# Patient Record
Sex: FEMALE | Race: WHITE | NOT HISPANIC OR LATINO | Employment: PART TIME | URBAN - METROPOLITAN AREA
[De-identification: names, ages, dates, MRNs, and addresses within clinical notes are randomized per-mention and may not be internally consistent; named-entity substitution may affect disease eponyms.]

---

## 2017-02-19 ENCOUNTER — HOSPITAL ENCOUNTER (OUTPATIENT)
Facility: HOSPITAL | Age: 47
Setting detail: OBSERVATION
Discharge: HOME | End: 2017-02-20
Attending: FAMILY MEDICINE | Admitting: INTERNAL MEDICINE

## 2017-02-19 ENCOUNTER — APPOINTMENT (OUTPATIENT)
Dept: GENERAL RADIOLOGY | Facility: HOSPITAL | Age: 47
End: 2017-02-19

## 2017-02-19 DIAGNOSIS — R07.9 CHEST PAIN, UNSPECIFIED TYPE: Primary | ICD-10-CM

## 2017-02-19 LAB
ALBUMIN SERPL-MCNC: 4.1 G/DL (ref 3.5–5.2)
ALBUMIN/GLOB SERPL: 1.6 G/DL
ALP SERPL-CCNC: 48 U/L (ref 39–117)
ALT SERPL W P-5'-P-CCNC: 36 U/L (ref 1–33)
ANION GAP SERPL CALCULATED.3IONS-SCNC: 14.5 MMOL/L
AST SERPL-CCNC: 31 U/L (ref 1–32)
BASOPHILS # BLD AUTO: 0.04 10*3/MM3 (ref 0–0.2)
BASOPHILS NFR BLD AUTO: 0.5 % (ref 0–1.5)
BILIRUB SERPL-MCNC: 0.4 MG/DL (ref 0.1–1.2)
BUN BLD-MCNC: 13 MG/DL (ref 6–20)
BUN/CREAT SERPL: 24.5 (ref 7–25)
CALCIUM SPEC-SCNC: 9 MG/DL (ref 8.6–10.5)
CHLORIDE SERPL-SCNC: 97 MMOL/L (ref 98–107)
CO2 SERPL-SCNC: 26.5 MMOL/L (ref 22–29)
CREAT BLD-MCNC: 0.53 MG/DL (ref 0.57–1)
DEPRECATED RDW RBC AUTO: 37.1 FL (ref 37–54)
EOSINOPHIL # BLD AUTO: 0.25 10*3/MM3 (ref 0–0.7)
EOSINOPHIL NFR BLD AUTO: 3.2 % (ref 0.3–6.2)
ERYTHROCYTE [DISTWIDTH] IN BLOOD BY AUTOMATED COUNT: 12.2 % (ref 11.7–13)
FLUAV AG NPH QL: NEGATIVE
FLUBV AG NPH QL IA: NEGATIVE
GFR SERPL CREATININE-BSD FRML MDRD: 124 ML/MIN/1.73
GLOBULIN UR ELPH-MCNC: 2.6 GM/DL
GLUCOSE BLD-MCNC: 257 MG/DL (ref 65–99)
GLUCOSE BLDC GLUCOMTR-MCNC: 124 MG/DL (ref 70–130)
GLUCOSE BLDC GLUCOMTR-MCNC: 236 MG/DL (ref 70–130)
HCG SERPL QL: NEGATIVE
HCT VFR BLD AUTO: 40.2 % (ref 35.6–45.5)
HGB BLD-MCNC: 14.2 G/DL (ref 11.9–15.5)
HOLD SPECIMEN: NORMAL
IMM GRANULOCYTES # BLD: 0.06 10*3/MM3 (ref 0–0.03)
IMM GRANULOCYTES NFR BLD: 0.8 % (ref 0–0.5)
LYMPHOCYTES # BLD AUTO: 2.51 10*3/MM3 (ref 0.9–4.8)
LYMPHOCYTES NFR BLD AUTO: 32.3 % (ref 19.6–45.3)
MCH RBC QN AUTO: 29.6 PG (ref 26.9–32)
MCHC RBC AUTO-ENTMCNC: 35.3 G/DL (ref 32.4–36.3)
MCV RBC AUTO: 83.8 FL (ref 80.5–98.2)
MONOCYTES # BLD AUTO: 0.57 10*3/MM3 (ref 0.2–1.2)
MONOCYTES NFR BLD AUTO: 7.3 % (ref 5–12)
NEUTROPHILS # BLD AUTO: 4.34 10*3/MM3 (ref 1.9–8.1)
NEUTROPHILS NFR BLD AUTO: 55.9 % (ref 42.7–76)
PLATELET # BLD AUTO: 232 10*3/MM3 (ref 140–500)
PMV BLD AUTO: 10.6 FL (ref 6–12)
POTASSIUM BLD-SCNC: 3.6 MMOL/L (ref 3.5–5.2)
PROT SERPL-MCNC: 6.7 G/DL (ref 6–8.5)
RBC # BLD AUTO: 4.8 10*6/MM3 (ref 3.9–5.2)
SODIUM BLD-SCNC: 138 MMOL/L (ref 136–145)
TROPONIN T SERPL-MCNC: <0.01 NG/ML (ref 0–0.03)
WBC NRBC COR # BLD: 7.77 10*3/MM3 (ref 4.5–10.7)
WHOLE BLOOD HOLD SPECIMEN: NORMAL
WHOLE BLOOD HOLD SPECIMEN: NORMAL

## 2017-02-19 PROCEDURE — G0378 HOSPITAL OBSERVATION PER HR: HCPCS

## 2017-02-19 PROCEDURE — 99284 EMERGENCY DEPT VISIT MOD MDM: CPT

## 2017-02-19 PROCEDURE — 93005 ELECTROCARDIOGRAM TRACING: CPT | Performed by: FAMILY MEDICINE

## 2017-02-19 PROCEDURE — 93010 ELECTROCARDIOGRAM REPORT: CPT | Performed by: INTERNAL MEDICINE

## 2017-02-19 PROCEDURE — 71020 HC CHEST PA AND LATERAL: CPT

## 2017-02-19 PROCEDURE — 93005 ELECTROCARDIOGRAM TRACING: CPT | Performed by: INTERNAL MEDICINE

## 2017-02-19 PROCEDURE — 84703 CHORIONIC GONADOTROPIN ASSAY: CPT | Performed by: FAMILY MEDICINE

## 2017-02-19 PROCEDURE — 93005 ELECTROCARDIOGRAM TRACING: CPT

## 2017-02-19 PROCEDURE — 63710000001 INSULIN LISPRO (HUMAN) PER 5 UNITS: Performed by: INTERNAL MEDICINE

## 2017-02-19 PROCEDURE — 36415 COLL VENOUS BLD VENIPUNCTURE: CPT

## 2017-02-19 PROCEDURE — 25010000002 ENOXAPARIN PER 10 MG: Performed by: INTERNAL MEDICINE

## 2017-02-19 PROCEDURE — 84484 ASSAY OF TROPONIN QUANT: CPT | Performed by: FAMILY MEDICINE

## 2017-02-19 PROCEDURE — 80053 COMPREHEN METABOLIC PANEL: CPT | Performed by: FAMILY MEDICINE

## 2017-02-19 PROCEDURE — 85025 COMPLETE CBC W/AUTO DIFF WBC: CPT | Performed by: FAMILY MEDICINE

## 2017-02-19 PROCEDURE — 82962 GLUCOSE BLOOD TEST: CPT

## 2017-02-19 PROCEDURE — 87804 INFLUENZA ASSAY W/OPTIC: CPT | Performed by: FAMILY MEDICINE

## 2017-02-19 RX ORDER — SIMVASTATIN 20 MG
20 TABLET ORAL NIGHTLY
Status: DISCONTINUED | OUTPATIENT
Start: 2017-02-19 | End: 2017-02-20 | Stop reason: HOSPADM

## 2017-02-19 RX ORDER — ASPIRIN 325 MG
325 TABLET ORAL ONCE
Status: COMPLETED | OUTPATIENT
Start: 2017-02-19 | End: 2017-02-19

## 2017-02-19 RX ORDER — CITALOPRAM 20 MG/1
20 TABLET ORAL DAILY
Status: DISCONTINUED | OUTPATIENT
Start: 2017-02-20 | End: 2017-02-19

## 2017-02-19 RX ORDER — VALSARTAN AND HYDROCHLOROTHIAZIDE 160; 12.5 MG/1; MG/1
1 TABLET, FILM COATED ORAL DAILY
Status: DISCONTINUED | OUTPATIENT
Start: 2017-02-19 | End: 2017-02-20 | Stop reason: HOSPADM

## 2017-02-19 RX ORDER — ATORVASTATIN CALCIUM 10 MG/1
10 TABLET, FILM COATED ORAL DAILY
Status: DISCONTINUED | OUTPATIENT
Start: 2017-02-19 | End: 2017-02-19

## 2017-02-19 RX ORDER — GLIPIZIDE 5 MG/1
5 TABLET, FILM COATED, EXTENDED RELEASE ORAL DAILY
COMMUNITY
Start: 2017-01-13

## 2017-02-19 RX ORDER — GLIPIZIDE 5 MG/1
5 TABLET, FILM COATED, EXTENDED RELEASE ORAL DAILY
Status: DISCONTINUED | OUTPATIENT
Start: 2017-02-19 | End: 2017-02-19

## 2017-02-19 RX ORDER — CITALOPRAM 20 MG/1
20 TABLET ORAL DAILY
Status: DISCONTINUED | OUTPATIENT
Start: 2017-02-19 | End: 2017-02-20 | Stop reason: HOSPADM

## 2017-02-19 RX ORDER — VALSARTAN AND HYDROCHLOROTHIAZIDE 160; 12.5 MG/1; MG/1
1 TABLET, FILM COATED ORAL DAILY
COMMUNITY
Start: 2017-02-14

## 2017-02-19 RX ORDER — GLIPIZIDE 5 MG/1
5 TABLET, FILM COATED, EXTENDED RELEASE ORAL DAILY
Status: DISCONTINUED | OUTPATIENT
Start: 2017-02-19 | End: 2017-02-20 | Stop reason: HOSPADM

## 2017-02-19 RX ORDER — NITROGLYCERIN 0.4 MG/1
0.4 TABLET SUBLINGUAL
Status: DISCONTINUED | OUTPATIENT
Start: 2017-02-19 | End: 2017-02-20 | Stop reason: HOSPADM

## 2017-02-19 RX ORDER — VALSARTAN AND HYDROCHLOROTHIAZIDE 160; 12.5 MG/1; MG/1
1 TABLET, FILM COATED ORAL DAILY
Status: DISCONTINUED | OUTPATIENT
Start: 2017-02-19 | End: 2017-02-19

## 2017-02-19 RX ORDER — SODIUM CHLORIDE 0.9 % (FLUSH) 0.9 %
1-10 SYRINGE (ML) INJECTION AS NEEDED
Status: DISCONTINUED | OUTPATIENT
Start: 2017-02-19 | End: 2017-02-20 | Stop reason: HOSPADM

## 2017-02-19 RX ORDER — GLIPIZIDE 5 MG/1
5 TABLET, FILM COATED, EXTENDED RELEASE ORAL DAILY
Status: DISCONTINUED | OUTPATIENT
Start: 2017-02-20 | End: 2017-02-19

## 2017-02-19 RX ORDER — CITALOPRAM 20 MG/1
20 TABLET ORAL DAILY
COMMUNITY
Start: 2016-04-21 | End: 2017-04-21

## 2017-02-19 RX ORDER — VALSARTAN AND HYDROCHLOROTHIAZIDE 160; 12.5 MG/1; MG/1
1 TABLET, FILM COATED ORAL DAILY
Status: DISCONTINUED | OUTPATIENT
Start: 2017-02-20 | End: 2017-02-19

## 2017-02-19 RX ORDER — SIMVASTATIN 20 MG
20 TABLET ORAL NIGHTLY
COMMUNITY
Start: 2017-01-10 | End: 2018-01-10

## 2017-02-19 RX ORDER — CITALOPRAM 20 MG/1
20 TABLET ORAL DAILY
Status: DISCONTINUED | OUTPATIENT
Start: 2017-02-19 | End: 2017-02-19

## 2017-02-19 RX ORDER — NITROGLYCERIN 0.4 MG/1
0.4 TABLET SUBLINGUAL
COMMUNITY
Start: 2017-01-10 | End: 2018-01-10

## 2017-02-19 RX ORDER — SODIUM CHLORIDE 0.9 % (FLUSH) 0.9 %
10 SYRINGE (ML) INJECTION AS NEEDED
Status: DISCONTINUED | OUTPATIENT
Start: 2017-02-19 | End: 2017-02-20 | Stop reason: HOSPADM

## 2017-02-19 RX ADMIN — CITALOPRAM 20 MG: 20 TABLET ORAL at 18:45

## 2017-02-19 RX ADMIN — Medication 20 MG: at 18:46

## 2017-02-19 RX ADMIN — GLIPIZIDE 5 MG: 5 TABLET, FILM COATED, EXTENDED RELEASE ORAL at 18:45

## 2017-02-19 RX ADMIN — NITROGLYCERIN 1 INCH: 20 OINTMENT TOPICAL at 18:00

## 2017-02-19 RX ADMIN — Medication 1000 MG: at 18:45

## 2017-02-19 RX ADMIN — INSULIN LISPRO 12 UNITS: 100 INJECTION, SOLUTION INTRAVENOUS; SUBCUTANEOUS at 18:00

## 2017-02-19 RX ADMIN — ASPIRIN 325 MG: 325 TABLET ORAL at 12:21

## 2017-02-19 RX ADMIN — VALSARTAN AND HYDROCHLOROTHIAZIDE 1 TABLET: 160; 12.5 TABLET, FILM COATED ORAL at 18:45

## 2017-02-19 RX ADMIN — ENOXAPARIN SODIUM 40 MG: 40 INJECTION SUBCUTANEOUS at 17:59

## 2017-02-19 NOTE — ED PROVIDER NOTES
EMERGENCY DEPARTMENT ENCOUNTER    CHIEF COMPLAINT  Chief Complaint: chest pain  History given by: patient  History limited by: nothing  Room Number: 30/30  PMD: Fina Levin MD      HPI:  Pt is a 46 y.o. female who presents complaining of substernal chest pain/pressure onset ~1 hour ago that lasted ~20 minutes and did not radiate. Pt states her pain was worse with deep inspiration and she took 2 nitro that provided some relief. Pt also c/o cough for several weeks as well as post nasal drainage and fever 1 week ago that are now resolved. Pt denies personal or family hx of heart problems or blood clots.     Duration:  ~1 hour  Onset: gradual  Timing: constant  Location: substernal  Radiation: none  Quality: pressure  Intensity/Severity: moderate  Progression: resolved  Associated Symptoms: cough  Aggravating Factors: deep inspiration   Alleviating Factors: nitroglycerin  Previous Episodes: yes  Treatment before arrival: nitroglycerin    PAST MEDICAL HISTORY  Active Ambulatory Problems     Diagnosis Date Noted   • No Active Ambulatory Problems     Resolved Ambulatory Problems     Diagnosis Date Noted   • No Resolved Ambulatory Problems     Past Medical History   Diagnosis Date   • Diabetes mellitus    • Hyperlipidemia    • Hypertension    • Kidney stone    • Murmur        PAST SURGICAL HISTORY  Past Surgical History   Procedure Laterality Date   • Kidney stone surgery         FAMILY HISTORY  History reviewed. No pertinent family history.    SOCIAL HISTORY  Social History     Social History   • Marital status:      Spouse name: N/A   • Number of children: N/A   • Years of education: N/A     Occupational History   • Not on file.     Social History Main Topics   • Smoking status: Never Smoker   • Smokeless tobacco: Not on file   • Alcohol use No   • Drug use: Not on file   • Sexual activity: Not on file     Other Topics Concern   • Not on file     Social History Narrative   • No narrative on file        ALLERGIES  Cefaclor    REVIEW OF SYSTEMS  Review of Systems   Constitutional: Negative for chills and fatigue.   HENT: Negative for congestion, rhinorrhea and sore throat.    Eyes: Negative for pain.   Respiratory: Positive for cough. Negative for shortness of breath and wheezing.    Cardiovascular: Positive for chest pain (pressure). Negative for palpitations and leg swelling.   Gastrointestinal: Negative for abdominal pain, diarrhea, nausea and vomiting.   Genitourinary: Negative for difficulty urinating, dysuria, flank pain and frequency.   Musculoskeletal: Negative for arthralgias, myalgias, neck pain and neck stiffness.   Skin: Negative for rash.   Neurological: Negative for dizziness, speech difficulty, weakness, light-headedness, numbness and headaches.   Psychiatric/Behavioral: Negative.    All other systems reviewed and are negative.      PHYSICAL EXAM  ED Triage Vitals   Temp Heart Rate Resp BP SpO2   02/19/17 1153 02/19/17 1153 02/19/17 1200 02/19/17 1200 02/19/17 1153   99.4 °F (37.4 °C) 127 16 138/77 99 %      Temp src Heart Rate Source Patient Position BP Location FiO2 (%)   -- -- -- -- --              Physical Exam   Constitutional: She is oriented to person, place, and time and well-developed, well-nourished, and in no distress. No distress.   HENT:   Head: Normocephalic and atraumatic.   Eyes: EOM are normal. Pupils are equal, round, and reactive to light.   Neck: Normal range of motion. Neck supple.   Cardiovascular: Normal rate, regular rhythm and normal heart sounds.    Pulmonary/Chest: Effort normal and breath sounds normal. No respiratory distress.   Abdominal: Soft. There is no tenderness. There is no rebound and no guarding.   Musculoskeletal: Normal range of motion. She exhibits no edema.   Neurological: She is alert and oriented to person, place, and time. She has normal sensation and normal strength.   Skin: Skin is warm and dry. No rash noted.   Psychiatric: Mood and affect normal.    Nursing note and vitals reviewed.      LAB RESULTS  Lab Results (last 24 hours)     Procedure Component Value Units Date/Time    CBC & Differential [[id number]] Collected:  02/19/17 1218    Specimen:  Blood Updated:  02/19/17 1242    Narrative:       The following orders were created for panel order CBC & Differential.  Procedure                               Abnormality         Status                     ---------                               -----------         ------                     CBC Auto Differential[[id number]]         Abnormal            Final result                 Please view results for these tests on the individual orders.    Comprehensive Metabolic Panel [[id number]]  (Abnormal) Collected:  02/19/17 1218    Specimen:  Blood Updated:  02/19/17 1248     Glucose 257 (H) mg/dL      BUN 13 mg/dL      Creatinine 0.53 (L) mg/dL      Sodium 138 mmol/L      Potassium 3.6 mmol/L      Chloride 97 (L) mmol/L      CO2 26.5 mmol/L      Calcium 9.0 mg/dL      Total Protein 6.7 g/dL      Albumin 4.10 g/dL      ALT (SGPT) 36 (H) U/L      AST (SGOT) 31 U/L      Alkaline Phosphatase 48 U/L      Total Bilirubin 0.4 mg/dL      eGFR Non African Amer 124 mL/min/1.73      Globulin 2.6 gm/dL      A/G Ratio 1.6 g/dL      BUN/Creatinine Ratio 24.5      Anion Gap 14.5 mmol/L     Troponin [[id number]]  (Normal) Collected:  02/19/17 1218    Specimen:  Blood Updated:  02/19/17 1248     Troponin T <0.010 ng/mL     Narrative:       Troponin T Reference Ranges:  Less than 0.03 ng/mL:    Negative for AMI  0.03 to 0.09 ng/mL:      Indeterminant for AMI  Greater than 0.09 ng/mL: Positive for AMI    hCG, Serum, Qualitative [[id number]]  (Normal) Collected:  02/19/17 1218    Specimen:  Blood Updated:  02/19/17 1254     HCG Qualitative Negative     CBC Auto Differential [[id number]]  (Abnormal) Collected:  02/19/17 1218    Specimen:  Blood Updated:  02/19/17 1242     WBC 7.77 10*3/mm3      RBC 4.80 10*6/mm3      Hemoglobin 14.2 g/dL       Hematocrit 40.2 %      MCV 83.8 fL      MCH 29.6 pg      MCHC 35.3 g/dL      RDW 12.2 %      RDW-SD 37.1 fl      MPV 10.6 fL      Platelets 232 10*3/mm3      Neutrophil % 55.9 %      Lymphocyte % 32.3 %      Monocyte % 7.3 %      Eosinophil % 3.2 %      Basophil % 0.5 %      Immature Grans % 0.8 (H) %      Neutrophils, Absolute 4.34 10*3/mm3      Lymphocytes, Absolute 2.51 10*3/mm3      Monocytes, Absolute 0.57 10*3/mm3      Eosinophils, Absolute 0.25 10*3/mm3      Basophils, Absolute 0.04 10*3/mm3      Immature Grans, Absolute 0.06 (H) 10*3/mm3     Influenza Antigen [[id number]]  (Normal) Collected:  02/19/17 1306    Specimen:  Swab from Nasopharynx Updated:  02/19/17 1324     Influenza A Ag, EIA Negative      Influenza B Ag, EIA Negative     Troponin [[id number]]  (Normal) Collected:  02/19/17 1358    Specimen:  Blood from Arm, Left Updated:  02/19/17 1428     Troponin T <0.010 ng/mL     Narrative:       Troponin T Reference Ranges:  Less than 0.03 ng/mL:    Negative for AMI  0.03 to 0.09 ng/mL:      Indeterminant for AMI  Greater than 0.09 ng/mL: Positive for AMI    Troponin [[id number]]  (Normal) Collected:  02/19/17 1519    Specimen:  Blood from Arm, Left Updated:  02/19/17 1547     Troponin T <0.010 ng/mL     Narrative:       Troponin T Reference Ranges:  Less than 0.03 ng/mL:    Negative for AMI  0.03 to 0.09 ng/mL:      Indeterminant for AMI  Greater than 0.09 ng/mL: Positive for AMI      I ordered the above labs and reviewed the results    RADIOLOGY  XR Chest 2 View   Preliminary Result   Negative PA and lateral chest radiographs.          I ordered the above noted radiological studies. Interpreted by radiologist. Discussed with radiologist. Reviewed by me in PACS.       PROCEDURES  Procedures  EKG         EKG time: 11:48  Rhythm/Rate: sinus tachycardia, rare 105  P waves and KS: normal P, poor R wave progression  QRS, axis: normal   ST and T waves: ST depression in leads V3, V4, V5, V6, I, II    Interpreted  Contemporaneously by me, independently viewed  No previous for comparison    EKG         EKG time: 14:57  Rhythm/Rate: NSR, rate 72  P waves and RI: poor R wave progression  QRS, axis: Q waves in leads III, aVF   ST and T waves: non specific changes     Interpreted Contemporaneously by me, independently viewed        PROGRESS AND CONSULTS  ED Course     1:28 PM  Pt rechecked and is resting comfortably. All pertinent lab/imaging/EKG findings discussed including nothing acute seen on CXR or labs. Discussed with that her cough and congestion and most likely viral. Pt/family verbalized understanding and agree with plan to consult cardiology regarding EKG changes. Pt states she has been told she has an irregular EKG. All questions and concerns addressed at this time.     2:50 PM  Pt rechecked and is resting comfortably. Pt states she has not had reoccurrence of chest pain while in the ED. Pt/family verbalized understanding and agree with plan to repeat EKG and consult cardiology. All questions and concerns addressed at this time.     2:52 PM  Discussed Pt's case with Dr. Gonzalez who agrees to review the Pt's EKG.     3:05 PM  Discussed Pt's case with Dr. Gonzalez who agrees to admit if felt necessary but is okay with discharging the Pt pending normal repeat troponin.     3:17 PM  Pt rechecked and is resting comfortably. Discussed consult with Dr. Gonzalez. Pt/family verbalized understanding and agree with plan to repeat troponin. All questions and concerns addressed at this time.     3:59 PM  Pt rechecked and is resting comfortably. Discussed normal repeat troponin. Discussed with Pt plan to admit for further evaluation of abnormal EKG with Heart Score=4. All questions and concerns addressed at this time.     4:14 PM  Notified Dr. Gonzalez of Pt's decision to be admitted.       MEDICAL DECISION MAKING  Results were reviewed/discussed with the patient and they were also made aware of online access. Pt  also made aware that some labs, such as cultures, will not be resulted during ER visit and follow up with PMD is necessary.     MDM  Number of Diagnoses or Management Options     Amount and/or Complexity of Data Reviewed  Clinical lab tests: ordered and reviewed (Troponin: Negative  Repeat troponin: Negative)  Tests in the radiology section of CPT®: ordered and reviewed (CXR: Negative)  Tests in the medicine section of CPT®: ordered and reviewed  Discuss the patient with other providers: yes (Dr. Gonzalez- cardiologist)  Independent visualization of images, tracings, or specimens: yes    Patient Progress  Patient progress: stable    HEART Score  History: Moderately suspicious (+1)  ECG: Non specific repolarization disturbance (+1)  Age: 45 through 65 (+1)  Risk Factors: 1 - 2 risk factors (+1)  Troponin: Normal limit or lower (+0)  Total: 4        DIAGNOSIS  Final diagnoses:   Chest pain, unspecified type       DISPOSITION  ADMISSION: Dr. Gonzalez    Discussed treatment plan and reason for admission with pt/family and admitting physician.  Pt/family voiced understanding of the plan for admission for further testing/treatment as needed.       Latest Documented Vital Signs:  As of 4:15 PM  BP- 133/81 HR- 80 Temp- 99.4 °F (37.4 °C) O2 sat- 97%    --  Documentation assistance provided by sara Maurice for Dr. Noam MD.  Information recorded by the scribe was done at my direction and has been verified and validated by me.       Chelle Maurice  02/19/17 4742       Nate Santacruz MD  02/19/17 0156

## 2017-02-19 NOTE — ED NOTES
Pt reports midsternal chest pressure that started approx 20 minutes PTA. Pt took two prescribed nitro tabs with partial pain relief. Pain does not radiate. Pt denies any other symptoms. Pt did not take an Aspirin today.      Brooklyn Tinoco RN  02/19/17 1200

## 2017-02-19 NOTE — PLAN OF CARE
Problem: Patient Care Overview (Adult)  Goal: Plan of Care Review  Outcome: Ongoing (interventions implemented as appropriate)    02/19/17 9085   Coping/Psychosocial Response Interventions   Plan Of Care Reviewed With patient   Patient Care Overview   Progress no change   Outcome Evaluation   Outcome Summary/Follow up Plan Pt admitted to floor. No Cp. VSS. Will have a stress test tomorrow. NPO after midnight. Will continue to monitor        Goal: Adult Individualization and Mutuality  Outcome: Ongoing (interventions implemented as appropriate)  Goal: Discharge Needs Assessment  Outcome: Ongoing (interventions implemented as appropriate)    Problem: Pain, Acute (Adult)  Goal: Identify Related Risk Factors and Signs and Symptoms  Outcome: Ongoing (interventions implemented as appropriate)  Goal: Acceptable Pain Control/Comfort Level  Outcome: Ongoing (interventions implemented as appropriate)

## 2017-02-20 ENCOUNTER — APPOINTMENT (OUTPATIENT)
Dept: NUCLEAR MEDICINE | Facility: HOSPITAL | Age: 47
End: 2017-02-20

## 2017-02-20 ENCOUNTER — APPOINTMENT (OUTPATIENT)
Dept: CARDIOLOGY | Facility: HOSPITAL | Age: 47
End: 2017-02-20
Attending: INTERNAL MEDICINE

## 2017-02-20 VITALS
TEMPERATURE: 98.5 F | DIASTOLIC BLOOD PRESSURE: 74 MMHG | HEART RATE: 89 BPM | SYSTOLIC BLOOD PRESSURE: 122 MMHG | BODY MASS INDEX: 33.99 KG/M2 | HEIGHT: 65 IN | OXYGEN SATURATION: 98 % | WEIGHT: 204 LBS | RESPIRATION RATE: 16 BRPM

## 2017-02-20 LAB
BH CV ECHO MEAS - ACS: 1.7 CM
BH CV ECHO MEAS - AO MAX PG (FULL): 1.7 MMHG
BH CV ECHO MEAS - AO MAX PG: 7.1 MMHG
BH CV ECHO MEAS - AO MEAN PG (FULL): 1 MMHG
BH CV ECHO MEAS - AO MEAN PG: 4 MMHG
BH CV ECHO MEAS - AO ROOT AREA (BSA CORRECTED): 1.4
BH CV ECHO MEAS - AO ROOT AREA: 5.7 CM^2
BH CV ECHO MEAS - AO ROOT DIAM: 2.7 CM
BH CV ECHO MEAS - AO V2 MAX: 133 CM/SEC
BH CV ECHO MEAS - AO V2 MEAN: 91.1 CM/SEC
BH CV ECHO MEAS - AO V2 VTI: 23.4 CM
BH CV ECHO MEAS - AVA(I,A): 2.6 CM^2
BH CV ECHO MEAS - AVA(I,D): 2.6 CM^2
BH CV ECHO MEAS - AVA(V,A): 2.5 CM^2
BH CV ECHO MEAS - AVA(V,D): 2.5 CM^2
BH CV ECHO MEAS - BSA(HAYCOCK): 2.1 M^2
BH CV ECHO MEAS - BSA: 2 M^2
BH CV ECHO MEAS - BZI_BMI: 33.9 KILOGRAMS/M^2
BH CV ECHO MEAS - BZI_METRIC_HEIGHT: 165.1 CM
BH CV ECHO MEAS - BZI_METRIC_WEIGHT: 92.5 KG
BH CV ECHO MEAS - CONTRAST EF (2CH): 52.9 ML/M^2
BH CV ECHO MEAS - CONTRAST EF 4CH: 58 ML/M^2
BH CV ECHO MEAS - EDV(CUBED): 85.2 ML
BH CV ECHO MEAS - EDV(MOD-SP2): 119 ML
BH CV ECHO MEAS - EDV(MOD-SP4): 131 ML
BH CV ECHO MEAS - EDV(TEICH): 87.7 ML
BH CV ECHO MEAS - EF(CUBED): 71.4 %
BH CV ECHO MEAS - EF(MOD-SP2): 52.9 %
BH CV ECHO MEAS - EF(MOD-SP4): 58 %
BH CV ECHO MEAS - EF(TEICH): 63.3 %
BH CV ECHO MEAS - ESV(CUBED): 24.4 ML
BH CV ECHO MEAS - ESV(MOD-SP2): 56 ML
BH CV ECHO MEAS - ESV(MOD-SP4): 55 ML
BH CV ECHO MEAS - ESV(TEICH): 32.2 ML
BH CV ECHO MEAS - FS: 34.1 %
BH CV ECHO MEAS - IVS/LVPW: 0.9
BH CV ECHO MEAS - IVSD: 0.9 CM
BH CV ECHO MEAS - LAT PEAK E' VEL: 9 CM/SEC
BH CV ECHO MEAS - LV DIASTOLIC VOL/BSA (35-75): 65.7 ML/M^2
BH CV ECHO MEAS - LV MASS(C)D: 137.8 GRAMS
BH CV ECHO MEAS - LV MASS(C)DI: 69.1 GRAMS/M^2
BH CV ECHO MEAS - LV MAX PG: 5.4 MMHG
BH CV ECHO MEAS - LV MEAN PG: 3 MMHG
BH CV ECHO MEAS - LV SYSTOLIC VOL/BSA (12-30): 27.6 ML/M^2
BH CV ECHO MEAS - LV V1 MAX: 116 CM/SEC
BH CV ECHO MEAS - LV V1 MEAN: 73.5 CM/SEC
BH CV ECHO MEAS - LV V1 VTI: 21.1 CM
BH CV ECHO MEAS - LVIDD: 4.4 CM
BH CV ECHO MEAS - LVIDS: 2.9 CM
BH CV ECHO MEAS - LVLD AP2: 7.6 CM
BH CV ECHO MEAS - LVLD AP4: 8.1 CM
BH CV ECHO MEAS - LVLS AP2: 6.5 CM
BH CV ECHO MEAS - LVLS AP4: 6.9 CM
BH CV ECHO MEAS - LVOT AREA (M): 2.8 CM^2
BH CV ECHO MEAS - LVOT AREA: 2.8 CM^2
BH CV ECHO MEAS - LVOT DIAM: 1.9 CM
BH CV ECHO MEAS - LVPWD: 1 CM
BH CV ECHO MEAS - MED PEAK E' VEL: 7 CM/SEC
BH CV ECHO MEAS - MV A DUR: 0.1 SEC
BH CV ECHO MEAS - MV A MAX VEL: 74.4 CM/SEC
BH CV ECHO MEAS - MV DEC SLOPE: 519 CM/SEC^2
BH CV ECHO MEAS - MV DEC TIME: 0.25 SEC
BH CV ECHO MEAS - MV E MAX VEL: 73.8 CM/SEC
BH CV ECHO MEAS - MV E/A: 0.99
BH CV ECHO MEAS - MV MAX PG: 3.4 MMHG
BH CV ECHO MEAS - MV MEAN PG: 2 MMHG
BH CV ECHO MEAS - MV P1/2T MAX VEL: 97 CM/SEC
BH CV ECHO MEAS - MV P1/2T: 54.7 MSEC
BH CV ECHO MEAS - MV V2 MAX: 92.7 CM/SEC
BH CV ECHO MEAS - MV V2 MEAN: 59.5 CM/SEC
BH CV ECHO MEAS - MV V2 VTI: 23.5 CM
BH CV ECHO MEAS - MVA P1/2T LCG: 2.3 CM^2
BH CV ECHO MEAS - MVA(P1/2T): 4 CM^2
BH CV ECHO MEAS - MVA(VTI): 2.5 CM^2
BH CV ECHO MEAS - PA ACC TIME: 0.09 SEC
BH CV ECHO MEAS - PA MAX PG (FULL): 3.8 MMHG
BH CV ECHO MEAS - PA MAX PG: 5.9 MMHG
BH CV ECHO MEAS - PA PR(ACCEL): 38.5 MMHG
BH CV ECHO MEAS - PA V2 MAX: 121 CM/SEC
BH CV ECHO MEAS - PULM A REVS DUR: 0.12 SEC
BH CV ECHO MEAS - PULM A REVS VEL: 49.8 CM/SEC
BH CV ECHO MEAS - PULM DIAS VEL: 41.4 CM/SEC
BH CV ECHO MEAS - PULM S/D: 1.6
BH CV ECHO MEAS - PULM SYS VEL: 65.1 CM/SEC
BH CV ECHO MEAS - PVA(V,A): 1.9 CM^2
BH CV ECHO MEAS - PVA(V,D): 1.9 CM^2
BH CV ECHO MEAS - QP/QS: 0.74
BH CV ECHO MEAS - RAP SYSTOLE: 3 MMHG
BH CV ECHO MEAS - RV MAX PG: 2.1 MMHG
BH CV ECHO MEAS - RV MEAN PG: 1 MMHG
BH CV ECHO MEAS - RV V1 MAX: 71.7 CM/SEC
BH CV ECHO MEAS - RV V1 MEAN: 45.6 CM/SEC
BH CV ECHO MEAS - RV V1 VTI: 14 CM
BH CV ECHO MEAS - RVOT AREA: 3.1 CM^2
BH CV ECHO MEAS - RVOT DIAM: 2 CM
BH CV ECHO MEAS - RVSP: 28.8 MMHG
BH CV ECHO MEAS - SI(AO): 67.2 ML/M^2
BH CV ECHO MEAS - SI(CUBED): 30.5 ML/M^2
BH CV ECHO MEAS - SI(LVOT): 30 ML/M^2
BH CV ECHO MEAS - SI(MOD-SP2): 31.6 ML/M^2
BH CV ECHO MEAS - SI(MOD-SP4): 38.1 ML/M^2
BH CV ECHO MEAS - SI(TEICH): 27.8 ML/M^2
BH CV ECHO MEAS - SUP REN AO DIAM: 2.6 CM
BH CV ECHO MEAS - SV(AO): 134 ML
BH CV ECHO MEAS - SV(CUBED): 60.8 ML
BH CV ECHO MEAS - SV(LVOT): 59.8 ML
BH CV ECHO MEAS - SV(MOD-SP2): 63 ML
BH CV ECHO MEAS - SV(MOD-SP4): 76 ML
BH CV ECHO MEAS - SV(RVOT): 44 ML
BH CV ECHO MEAS - SV(TEICH): 55.5 ML
BH CV ECHO MEAS - TAPSE (>1.6): 2.6 CM2
BH CV ECHO MEAS - TR MAX VEL: 254 CM/SEC
BH CV NUCLEAR PRIOR STUDY: 3
BH CV STRESS BP STAGE 1: NORMAL
BH CV STRESS BP STAGE 2: NORMAL
BH CV STRESS BP STAGE 3: NORMAL
BH CV STRESS DURATION MIN STAGE 1: 3
BH CV STRESS DURATION MIN STAGE 2: 3
BH CV STRESS DURATION MIN STAGE 3: 2
BH CV STRESS DURATION SEC STAGE 1: 0
BH CV STRESS DURATION SEC STAGE 2: 0
BH CV STRESS DURATION SEC STAGE 3: 55
BH CV STRESS GRADE STAGE 1: 10
BH CV STRESS GRADE STAGE 2: 12
BH CV STRESS GRADE STAGE 3: 14
BH CV STRESS HR STAGE 1: 119
BH CV STRESS HR STAGE 2: 146
BH CV STRESS HR STAGE 3: 157
BH CV STRESS METS STAGE 1: 5
BH CV STRESS METS STAGE 2: 7.5
BH CV STRESS METS STAGE 3: 10
BH CV STRESS PROTOCOL 1: NORMAL
BH CV STRESS RECOVERY BP: NORMAL MMHG
BH CV STRESS RECOVERY HR: 101 BPM
BH CV STRESS SPEED STAGE 1: 1.7
BH CV STRESS SPEED STAGE 2: 2.5
BH CV STRESS SPEED STAGE 3: 3.4
BH CV STRESS STAGE 1: 1
BH CV STRESS STAGE 2: 2
BH CV STRESS STAGE 3: 3
BH CV XLRA - RV BASE: 2.75 CM
BH CV XLRA - TDI S': 15 CM/SEC
E/E' RATIO: 10
GLUCOSE BLDC GLUCOMTR-MCNC: 178 MG/DL (ref 70–130)
GLUCOSE BLDC GLUCOMTR-MCNC: 183 MG/DL (ref 70–130)
LEFT ATRIUM VOLUME INDEX: 19 ML/M2
LV EF NUC BP: 60 %
MAXIMAL PREDICTED HEART RATE: 174 BPM
PERCENT MAX PREDICTED HR: 90.8 %
STRESS BASELINE BP: NORMAL MMHG
STRESS BASELINE HR: 89 BPM
STRESS PERCENT HR: 107 %
STRESS POST ESTIMATED WORKLOAD: 10.2 METS
STRESS POST EXERCISE DUR MIN: 8 MIN
STRESS POST EXERCISE DUR SEC: 55 SEC
STRESS POST PEAK BP: NORMAL MMHG
STRESS POST PEAK HR: 158 BPM
STRESS TARGET HR: 148 BPM
TROPONIN T SERPL-MCNC: <0.01 NG/ML (ref 0–0.03)

## 2017-02-20 PROCEDURE — 99235 HOSP IP/OBS SAME DATE MOD 70: CPT | Performed by: INTERNAL MEDICINE

## 2017-02-20 PROCEDURE — 93016 CV STRESS TEST SUPVJ ONLY: CPT | Performed by: INTERNAL MEDICINE

## 2017-02-20 PROCEDURE — 93018 CV STRESS TEST I&R ONLY: CPT | Performed by: INTERNAL MEDICINE

## 2017-02-20 PROCEDURE — 78452 HT MUSCLE IMAGE SPECT MULT: CPT | Performed by: INTERNAL MEDICINE

## 2017-02-20 PROCEDURE — 0 TECHNETIUM SESTAMIBI: Performed by: INTERNAL MEDICINE

## 2017-02-20 PROCEDURE — 82962 GLUCOSE BLOOD TEST: CPT

## 2017-02-20 PROCEDURE — 84484 ASSAY OF TROPONIN QUANT: CPT | Performed by: INTERNAL MEDICINE

## 2017-02-20 PROCEDURE — 96372 THER/PROPH/DIAG INJ SC/IM: CPT

## 2017-02-20 PROCEDURE — G0378 HOSPITAL OBSERVATION PER HR: HCPCS

## 2017-02-20 PROCEDURE — 93306 TTE W/DOPPLER COMPLETE: CPT

## 2017-02-20 PROCEDURE — 93017 CV STRESS TEST TRACING ONLY: CPT

## 2017-02-20 PROCEDURE — 78452 HT MUSCLE IMAGE SPECT MULT: CPT

## 2017-02-20 PROCEDURE — 93306 TTE W/DOPPLER COMPLETE: CPT | Performed by: INTERNAL MEDICINE

## 2017-02-20 PROCEDURE — A9500 TC99M SESTAMIBI: HCPCS | Performed by: INTERNAL MEDICINE

## 2017-02-20 RX ORDER — ASPIRIN 81 MG/1
81 TABLET ORAL DAILY
Start: 2017-02-20

## 2017-02-20 RX ORDER — FAMOTIDINE 20 MG/1
20 TABLET, FILM COATED ORAL DAILY
Qty: 30 TABLET | Refills: 0
Start: 2017-02-20 | End: 2017-03-22

## 2017-02-20 RX ORDER — PANTOPRAZOLE SODIUM 40 MG/1
40 TABLET, DELAYED RELEASE ORAL
Status: DISCONTINUED | OUTPATIENT
Start: 2017-02-20 | End: 2017-02-20 | Stop reason: HOSPADM

## 2017-02-20 RX ADMIN — Medication 1 DOSE: at 10:15

## 2017-02-20 RX ADMIN — Medication 1 DOSE: at 06:35

## 2017-02-20 RX ADMIN — GLIPIZIDE 5 MG: 5 TABLET, FILM COATED, EXTENDED RELEASE ORAL at 10:15

## 2017-02-20 RX ADMIN — Medication 1000 MG: at 10:16

## 2017-02-20 NOTE — PLAN OF CARE
Problem: Patient Care Overview (Adult)  Goal: Plan of Care Review  Outcome: Ongoing (interventions implemented as appropriate)    02/20/17 0647   Coping/Psychosocial Response Interventions   Plan Of Care Reviewed With patient   Patient Care Overview   Progress progress towards functional goals is fair   Outcome Evaluation   Outcome Summary/Follow up Plan PT WITHOUT COMPLAINT OR DISTRESS THIS SHIFT, STRESS TEST SCHEDULED FOR TODAY       Goal: Adult Individualization and Mutuality  Outcome: Ongoing (interventions implemented as appropriate)  Goal: Discharge Needs Assessment  Outcome: Ongoing (interventions implemented as appropriate)    Problem: Pain, Acute (Adult)  Goal: Identify Related Risk Factors and Signs and Symptoms  Outcome: Outcome(s) achieved Date Met:  02/20/17  Goal: Acceptable Pain Control/Comfort Level  Outcome: Ongoing (interventions implemented as appropriate)

## 2017-02-20 NOTE — CONSULTS
INTERNAL MEDICINE CONSULTATION     CONSULTING PHYSICIAN: Jordin Ng MD     REQUESTING PHYSICIAN: Nancy Gonzalez MD    REASON FOR CONSULTATION: Follow medical problems.    HISTORY: The patient is a 46-year-old white female with history of diabetes, hypertension, hyperlipidemia and kidney stones, who was admitted through the emergency room with substernal chest pain which started an hour prior to presentation to the ER with no radiation, no associated symptoms, no shortness of breath, no nausea, vomiting or diaphoresis. The pain did not relieve with aspirin but eased up with nitroglycerin and went away by itself. The patient was admitted to the cardiology service for further workup on the chest pain. I am asked to follow the patient for medical problems.     At the time of interview, she is pain free. Denies any headache, dizziness, shortness of breath, nausea, vomiting, fever, chills, cough, night sweats or weight loss.    PAST MEDICAL HISTORY:  1. Diabetes for almost 6 years.  2. Hypertension.  3. Hyperlipidemia.    PAST SURGICAL HISTORY: Kidney stone surgery in the past.    SOCIAL HISTORY: No tobacco, alcohol or drug abuse.    FAMILY HISTORY: Negative for coronary artery disease.    ALLERGIES: CEFACLOR.    CURRENT MEDICATIONS:  1. Celexa.  2. Lovenox.  3. Glucotrol.  4. Humalog.  5. Glucophage.  6. Nitroglycerin.  7. Protonix.  8. Zocor.  9. Diovan HCT.    PHYSICAL EXAMINATION:   GENERAL: Alert and oriented, in no distress.  VITAL SIGNS: Afebrile, pulse 89, respirations 18, blood pressure 122/74, O2 saturation is 98% on room air.  HEENT: Unremarkable.  NECK: Supple.  LUNGS: Clear.  HEART: S1 and S2.  ABDOMEN: Soft, nontender. Bowel sounds positive.  EXTREMITIES: No edema.  NEUROLOGIC: No focal deficit.    DIAGNOSTIC DATA: Potassium 3.6. Blood sugar is 134 to 257. Troponin less than 0.01. CBC normal. Influenza antigen negative. Chest x-ray shows no active disease. EKG shows sinus rhythm with nonspecific  ST-T waves. Echocardiogram is pending. Stress test is pending.    ASSESSMENT:   1. Chest pain with multiple coronary risk factors. Doubt cardiac in origin.  2. Diabetes, poorly controlled.  3. Hypertension.  4. Hyperlipidemia.  5. Gastroesophageal reflux disease.    RECOMMENDATIONS:   1. I agree with the current care. Will continue with Accu-Cheks with sliding scale. Will continue oral hypoglycemic. Will check hemoglobin A1c.  2. Will check TSH and lipid profile.  3. If Cardiology decides to let her go home, if the stress is normal she can go home and continue Accu-Cheks 4 times a day for a week and for further adjustment of her diabetic medications. If the patient is still here, will follow.

## 2017-02-20 NOTE — DISCHARGE SUMMARY
"[facility]  Physician Discharge Summary    Patient Identification:  Name: Jessica Deng  Age: 46 y.o.  Sex: female  :  1970  MRN: [id number]    Admit date: 2017    Discharge date and time:  17    Admitting Physician: Nancy Gonzalez MD     Discharge Physician: Dr Gonzalez    Discharge Diagnoses:      - chest pressure - no MI, stress test (-) for ischemia  - EF 55-60%, trace TR  - HTN  - DM  - Dyslipidemia       Discharged Condition: home    Hospital Course:   This patient was admitted for further evaluation after experiencing recurrent episodes of nonradiating substernal chest discomfort described as \"pressure\" worsened with inspiration and partially relieved with sublingual nitroglycerin.  She denies any accompanying diaphoresis, weakness, palpitations, dizziness, lightheadedness, near syncope or syncope.  Discomfort had resolved prior to arrival to the emergency room.  She was placed on aspirin, topical nitrate, DVT prophylaxis and PPI.  She had no recurrent discomfort.  Serial cardiac enzymes and EKGs were negative for acute cardiac ischemic event.  2-D echo was performed.  Stress test was negative for ischemia.    I reviewed stress test results with it's sensitivity/specificity of approximately 85% with the patient and spouse. They were advised that no further ischemic workup is planned at this time, however she may need future testing if symptoms recur. Patient was advised to return to ER  for recurrent symptoms at which time cardiac catheterization will be recommended. Rationale for/use of sublingual NTG reviewed   She was scheduled to to follow-up in our office on  and advised to see her primary jayy ordered.and perscription provided. All questions were answered. Patient/spouse both  voiced understanding and agreement with treatment plan.      Consults:   IP CONSULT TO INTERNAL MEDICINE    Discharge Exam:  General: No acute distress   Skin: Warm and dry, " no diaphoresis noted   EYES: PERTL   HEENT: external ear and nose normal; oral mucosa moist   Neck: no JVD   Heart: S1S2 regular rate and rhythm; no murmur appreciated   Pulmonary: Respirations regular, unlabored at rest, bilateral breath sounds have good air entry throughout all lung fields; no crackles or wheezes auscultated.     GI: Soft, non-tender, positive bowel sounds     Extremities: Bilateral lower extremities have no pre-tibial pitting edema   Neurological: Alert and oriented x 3; no neuro deficits          LABS:    Results from last 7 days  Lab Units 02/20/17  0428 02/19/17  1519 02/19/17  1358   TROPONIN T ng/mL <0.010 <0.010 <0.010       Results from last 7 days  Lab Units 02/19/17  1218   SODIUM mmol/L 138   POTASSIUM mmol/L 3.6   BUN mg/dL 13   CREATININE mg/dL 0.53*   CALCIUM mg/dL 9.0       Results from last 7 days  Lab Units 02/19/17  1218   WBC 10*3/mm3 7.77   HEMOGLOBIN g/dL 14.2   HEMATOCRIT % 40.2   PLATELETS 10*3/mm3 232             Disposition:  home    Discharge Medications:    Jessica Deng   Home Medication Instructions BENI:[id number]    Printed on:02/20/17 1513   Medication Information                      aspirin 81 MG EC tablet  Take 1 tablet by mouth Daily.             citalopram (CeleXA) 20 MG tablet  Take 20 mg by mouth Daily.             famotidine (PEPCID) 20 MG tablet  Take 1 tablet by mouth Daily for 30 days.             glipiZIDE (GLIPIZIDE XL) 5 MG ER tablet  Take 5 mg by mouth Daily.             Insulin Glargine (LANTUS SOLOSTAR) 100 UNIT/ML injection pen  Inject 56 Units under the skin Every Night.             insulin lispro (humaLOG) 100 UNIT/ML injection  Inject 12 Units under the skin Daily With Dinner.             metFORMIN (GLUCOPHAGE) 1000 MG tablet  Take 1,000 mg by mouth 2 (Two) Times a Day.             nitroglycerin (NITROSTAT) 0.4 MG SL tablet  Place 0.4 mg under the tongue Every 5 (Five) Minutes.             simvastatin (ZOCOR) 20 MG tablet  Take 20 mg by  mouth Every Night.             valsartan-hydrochlorothiazide (DIOVAN-HCT) 160-12.5 MG per tablet  Take 1 tablet by mouth Daily.                   Discharge Home Instructions:  1. Return to ER for recurrent symptoms at which time cardiac catheterization will be recommended.  2.  Follow up at our [facility] office on March 20 at 1:15 PM  3.  Follow-up with PCP 7-10 days      I reviewed the patient's new clinical results, discharge treatments, medications and follow up with Dr Gonzalez. I personally viewed and interpreted the patient's EKG/Telemetry data  Signed:  Nancy Gonzalez MD  2/20/2017  3:13 PM      EMR Dragon/Transcription disclaimer:   Much of this encounter note is an electronic transcription/translation of spoken language to printed text. The electronic translation of spoken language may permit erroneous, or at times, nonsensical words or phrases to be inadvertently transcribed; Although I have reviewed the note for such errors, some may still exist.

## 2017-02-20 NOTE — H&P
"[facility]  History & Physical Note                                                                                    Patient Identification:  Jessica Deng:   46 y.o.  female  1970  [id number]            Chief Complaint   Patient presents with   • Chest Pain       Date of Admission:  2-19-17    Admitting Physician:  Dr Gonzalez    Reason for Admission:Chest pain, unspecified type [R07.9], Chief Complaint   Patient presents with   • Chest Pain       History of Present Illness:     This is a 46-year-old female new to our service.  She has no documented history of CAD or arrhythia, but was evaluated by a cardiologist in 2006 due to heart murmur.  She had a STUART (see below) which showed no significant valvular heart disease and preserved LV function.   Her PCP treats her for hypertension, diabetes, dyslipidemia.  At her office visit with her PCP on 1- she reported chest pain at which time stress test was recommended.  According to the office note, the patient wanted \"to hold on stress test at this time\" and was given a prescription for sublingual nitroglycerin and instructed to go to the ER if she were to have recurrent chest pain.     The patient presented to the emergency room reporting a 20 minute episode of nonradiating substernal chest discomfort that occurred approximately an hour prior to arrival.  The discomfort is described as \"pressure\" in nature, was constant for approximately 20 minutes and was worsened with deep inspiration.  She took 2 sublingual nitroglycerin with partial relief.  She denies any accompanying diaphoresis, weakness, palpitations, dizziness, lightheadedness, near syncope or syncope.  The discomfort resolved prior to arrival to the emergency room.  She denies any recurrence.  She reports an episode of fever one week ago with persistent nonproductive cough and nasal drainage.     Admission EKG (see below) show sinus rhythm with anterolateral septal Q waves " and diffuse NSSTTW abnl.  Cardiac enzymes negative ×4.  Chest x-ray clear lung fields with normal cardiomediastinal silhouette    Cardiac Risk Factors:  Diabetes, dyslipidemia, hypertension     Past Medical History:  Past Medical History   Diagnosis Date   • Diabetes mellitus    • Hyperlipidemia    • Hypertension    • Kidney stone    • Murmur     at least 3 stable    Past Surgical History:  Past Surgical History   Procedure Laterality Date   • Kidney stone surgery          Social History:   Social History   Substance Use Topics   • Smoking status: Never Smoker   • Smokeless tobacco: Not on file   • Alcohol use No        Family History:  History reviewed. No pertinent family history.       Allergies:  Allergies   Allergen Reactions   • Cefaclor        Home Meds:  No current facility-administered medications on file prior to encounter.      No current outpatient prescriptions on file prior to encounter.         Scheduled Meds:    citalopram 20 mg Daily   enoxaparin 40 mg Daily   glipiZIDE 5 mg Daily   insulin lispro 12 Units Daily With Dinner   metFORMIN 1,000 mg BID   simvastatin 20 mg Nightly   valsartan-hydrochlorothiazide 1 tablet Daily           INTAKE AND OUTPUT:  No intake or output data in the 24 hours ending 02/20/17 0822        Review of Systems:  Constitutional:  fever one week ago, no rigors chills    Eyes: No eye pain, vision changes   ENT/oropharynx: No difficulty swallowing, no epistaxis, no changes in hearing   Cardiovascular: No  palpitations, paroxysmal nocturnal dyspnea, orthopnea, diaphoresis, dizziness / syncopal episode   Respiratory: No dyspnea on exertion, + nonproductive cough,  rhinorrhea    Gastrointestinal: No abdominal pain, nausea, vomiting, diarrhea   Genitourinary: No  dysuria   Neurological: No headache, numbness, one-sided weakness    Musculoskeletal: No nausea or neck  pain   Integument: No rash, edema       Constitutional:  Temp:  [97.8 °F (36.6 °C)-99.4 °F (37.4 °C)] 98.8 °F (37.1  °C)  Heart Rate:  [] 80  Resp:  [16-18] 18  BP: (108-144)/(65-82) 118/77     Physical Exam by Nancy Gonzalez MD  General:  Well-developed, well-nourished white female resting quietly.  Appears in no acute distress  Eyes: PERTL,  HEENT:  No JVD. Thyroid not visibly enlarged. No mucosal pallor or cyanosis  Respiratory: Respirations regular and unlabored at rest. BBS with good air entry in all fields. No crackles, rubs or wheezes auscultated  Cardiovascular: S1S2 regular rate and rhythm. No murmur, rubs or gallop appreciated. No carotid bruits. /PT pulses 2. No lower extremity pitting edema  Gastrointestinal: Abdomen soft,  non tender. Bowel sounds present.  No ascites  Musculoskeletal: CASTELLANO x4. No abnormal movements  Extremities: No digital clubbing or cyanosis  Skin: Color pink. Skin warm and dry to touch. No rashes  No xanthoma  Neuro: AAO x3 CN II-XII grossly intact                             Cardiographics    ECG:       Telemetry:         ECHO:  Pending      STUART 2006:      Lab Review:    Results from last 7 days  Lab Units 02/20/17  0428 02/19/17  1519 02/19/17  1358   TROPONIN T ng/mL <0.010 <0.010 <0.010           Results from last 7 days  Lab Units 02/19/17  1218   SODIUM mmol/L 138   POTASSIUM mmol/L 3.6   BUN mg/dL 13   CREATININE mg/dL 0.53*   CALCIUM mg/dL 9.0       Results from last 7 days  Lab Units 02/19/17  1218   WBC 10*3/mm3 7.77   HEMOGLOBIN g/dL 14.2   HEMATOCRIT % 40.2   PLATELETS 10*3/mm3 232     CXR IMPRESSION:  Negative PA and lateral chest radiographs.       Assessment / Plan:    - chest pressure - no MI  - Abnormal EKG  - HTN  - DM  - Dyslipidemia      Recommendations:  In summary, this is a 46-year-old white female who presents with recurring episodes of chest discomfort and an abnormal baseline EKG.  MI has been ruled out however,  she has multiple cardiac risk factors.  We'll proceed with stress testing and 2-D echo this morning.  Internal medicine will see the patient.   Further recommendations treatment plan pending      I reviewed the patient's new clinical results and treatment plan with Dr Gonzalez. I personally viewed and interpreted the patient's EKG/Telemetry data    Nancy Gonzalez MD  2/20/2017  8:22 AM    EMR Dragon/Transcription disclaimer:   Much of this encounter note is an electronic transcription/translation of spoken language to printed text. The electronic translation of spoken language may permit erroneous, or at times, nonsensical words or phrases to be inadvertently transcribed; Although I have reviewed the note for such errors, some may still exist.

## 2017-02-20 NOTE — PLAN OF CARE
Problem: Patient Care Overview (Adult)  Goal: Plan of Care Review  Outcome: Outcome(s) achieved Date Met:  02/20/17 02/20/17 1612   Coping/Psychosocial Response Interventions   Plan Of Care Reviewed With patient   Patient Care Overview   Progress improving   Outcome Evaluation   Outcome Summary/Follow up Plan VSS. Stress test and echo normal. Being discharged.        Goal: Adult Individualization and Mutuality  Outcome: Outcome(s) achieved Date Met:  02/20/17  Goal: Discharge Needs Assessment  Outcome: Outcome(s) achieved Date Met:  02/20/17    Problem: Pain, Acute (Adult)  Goal: Acceptable Pain Control/Comfort Level  Outcome: Outcome(s) achieved Date Met:  02/20/17